# Patient Record
Sex: FEMALE | Race: WHITE
[De-identification: names, ages, dates, MRNs, and addresses within clinical notes are randomized per-mention and may not be internally consistent; named-entity substitution may affect disease eponyms.]

---

## 2022-07-14 PROBLEM — Z00.00 ENCOUNTER FOR PREVENTIVE HEALTH EXAMINATION: Status: ACTIVE | Noted: 2022-07-14

## 2022-07-15 ENCOUNTER — APPOINTMENT (OUTPATIENT)
Dept: RADIOLOGY | Facility: CLINIC | Age: 62
End: 2022-07-15

## 2022-07-15 PROCEDURE — 77080 DXA BONE DENSITY AXIAL: CPT

## 2022-07-31 ENCOUNTER — EMERGENCY (EMERGENCY)
Facility: HOSPITAL | Age: 62
LOS: 1 days | Discharge: HOME | End: 2022-07-31
Attending: EMERGENCY MEDICINE | Admitting: EMERGENCY MEDICINE
Payer: COMMERCIAL

## 2022-07-31 VITALS
SYSTOLIC BLOOD PRESSURE: 122 MMHG | OXYGEN SATURATION: 100 % | HEART RATE: 87 BPM | RESPIRATION RATE: 18 BRPM | TEMPERATURE: 98 F | DIASTOLIC BLOOD PRESSURE: 54 MMHG

## 2022-07-31 LAB
APTT BLD: 24.4 SEC — LOW (ref 27.5–35.5)
BLD GP AB SCN SERPL QL: NEGATIVE — SIGNIFICANT CHANGE UP
INR BLD: 0.98 — SIGNIFICANT CHANGE UP (ref 0.88–1.16)
LACTATE SERPL-SCNC: 2 MMOL/L — SIGNIFICANT CHANGE UP (ref 0.5–2)
LIDOCAIN IGE QN: 21 U/L — SIGNIFICANT CHANGE UP (ref 7–60)
PROTHROM AB SERPL-ACNC: 11.7 SEC — SIGNIFICANT CHANGE UP (ref 10.5–13.4)
RH IG SCN BLD-IMP: POSITIVE — SIGNIFICANT CHANGE UP

## 2022-07-31 PROCEDURE — 99285 EMERGENCY DEPT VISIT HI MDM: CPT

## 2022-07-31 RX ORDER — ONDANSETRON 8 MG/1
4 TABLET, FILM COATED ORAL ONCE
Refills: 0 | Status: COMPLETED | OUTPATIENT
Start: 2022-07-31 | End: 2022-07-31

## 2022-07-31 RX ORDER — DIATRIZOATE MEGLUMINE 180 MG/ML
30 INJECTION, SOLUTION INTRAVESICAL ONCE
Refills: 0 | Status: COMPLETED | OUTPATIENT
Start: 2022-07-31 | End: 2022-07-31

## 2022-07-31 RX ORDER — SODIUM CHLORIDE 9 MG/ML
1000 INJECTION INTRAMUSCULAR; INTRAVENOUS; SUBCUTANEOUS ONCE
Refills: 0 | Status: COMPLETED | OUTPATIENT
Start: 2022-07-31 | End: 2022-07-31

## 2022-07-31 RX ADMIN — DIATRIZOATE MEGLUMINE 30 MILLILITER(S): 180 INJECTION, SOLUTION INTRAVESICAL at 22:52

## 2022-07-31 RX ADMIN — SODIUM CHLORIDE 1000 MILLILITER(S): 9 INJECTION INTRAMUSCULAR; INTRAVENOUS; SUBCUTANEOUS at 22:52

## 2022-07-31 RX ADMIN — ONDANSETRON 4 MILLIGRAM(S): 8 TABLET, FILM COATED ORAL at 22:52

## 2022-07-31 NOTE — ED PROVIDER NOTE - NSFOLLOWUPINSTRUCTIONS_ED_ALL_ED_FT
You were seen in the Emergency Department today for nausea and vomiting.  Your work-up (labs, CT imaging) were reassuring to rule out an acute medical emergency.   Your results are on the pages to follow. We are unsure of exactly what is causing your symptoms but at this time, no other emergency testing is indicated in the ER today.    Drink plenty of fluids, get plenty of result.   Avoid spicy, greasy foods in coming days and try eating a bland diet.   Take over the counter medications (pepcid / maalox) as needed for stomach upset.    FOLLOW UP - You will require further evaluation after leaving the Emergency Department today. The remaining workup will be done as an outpatient given we don't have the capacity to perform every test in the ER. Follow up with your Primary Care Doctor within one week for further evaluation and management.    RETURN PRECAUTIONS - Return to this Emergency Department if you have any persistent, worsening, or new symptoms including severe abdominal pain, uncontrollable nausea/vomiting, severe diarrhea or inability to have a bowel movement, very dark/black stools or blood in your stool, burning with urination, high fever >102 degrees F, or any other serious concerns.

## 2022-07-31 NOTE — ED PROVIDER NOTE - NS ED ATTENDING STATEMENT MOD
This was a shared visit with the ARAMIS. I reviewed and verified the documentation and independently performed the documented:

## 2022-07-31 NOTE — ED PROVIDER NOTE - PATIENT PORTAL LINK FT
You can access the FollowMyHealth Patient Portal offered by [facility] by registering at the following website: [email or web address]. By joining [employer]’s FollowMyHealth portal, you will also be able to view your health information using other applications (apps) compatible with our system.

## 2022-07-31 NOTE — ED PROVIDER NOTE - CLINICAL SUMMARY MEDICAL DECISION MAKING FREE TEXT BOX
pt w history of hld, here w nausea & vomiting since 6pm. no abd pain. last BM this afternoon. no hx abd surgeries. sent to r/o SBO.   pt appears uncomfortable on arrival but vitals stable. abdominal exam benign.  low suspicion SBO given no abd pain and no surgical hx. possible gastritis, gastroenteritis  plan for labs, ua, ctap  iv hydration  antiemetics prn  serial abdominal exams.

## 2022-07-31 NOTE — ED ADULT NURSE NOTE - OBJECTIVE STATEMENT
Pt arrived to ED AAOX4, spont unlab breathing on RA, NAD. PT is c/o nausea and vomiting that began today. Pt called PMD and he advised pt to come to ED to rule out SBO. Pt has no hx of SBO, pt had small BM today. No diarrhea, no constipation, no blood in stool. Pt denies hematemesis and states vomit looks like food, and now is appearing yellow. Pt denies any changes with urination. Will monitor.

## 2022-07-31 NOTE — ED PROVIDER NOTE - PROGRESS NOTE DETAILS
labs unremarkable  ct without acute findings.   informed pt of results, pt and  reassured by unremarkable work-up. symptoms resolved. no n/v at this time. tolerating PO in ED. rpt abdominal exam benign. ok for dc and outpt follow up at this time.     All results reviewed with the patient verbally. Discharge plan and return precautions d/w pt who verbalized understanding and agrees with plan. All questions answered. Vitals WNL. Ready for d/c.

## 2022-07-31 NOTE — ED PROVIDER NOTE - ATTENDING APP SHARED VISIT CONTRIBUTION OF CARE
61 yr old female, history of HLD, presents to the Emergency Department with nausea and vomiting. Pt in usual state of health yesterday throughout the day. Around 6pm pt started w nausea and vomiting. Multiple episodes of non-bloody emesis since this time. Has primarily been food she ate. No abd pain. Was told by PMD to come to ED for r/o SBO. Last BM this afternoon. No diarrhea. No dark / bloody stools.   No episodes of similar in past. No hx abdominal surgeries. No hx SBO.   No fever, chest pain, SOB, urinary symptoms, leg swelling, headache, dizziness  vss  s1s2 lungs cta bl  abd soft nt nd +bs  IMP- N/V  zofran, IVF, CT a/p

## 2022-07-31 NOTE — ED PROVIDER NOTE - PHYSICAL EXAMINATION
Constitutional : Appears uncomfortable but nontoxic. awake, alert, oriented to person, place, time/situation.  Head : head normocephalic, atraumatic  EENMT : eyes clear bilaterally, PERRL, EOMI. airway patent. moist mucous membranes. neck supple.  Cardiac : Normal rate, regular rhythm. No murmur appreciated, no LE edema.  Resp : Breath sounds clear and equal bilaterally. Respirations even and unlabored.   Gastro : abdomen soft, nontender, nondistended. no rebound or guarding. no CVAT.  MSK :  range of motion is not limited, no muscle or joint tenderness  Vasc : Extremities warm and well perfused. 2+ radial and DP pulses. cap refill <2 seconds  Neuro : Alert and oriented, CNII-XII grossly intact, no focal deficits, no motor or sensory deficits.  Skin : Skin normal color for race, warm, dry and intact. No evidence of rash.  Psych : Alert and oriented to person, place, time/situation. normal mood and affect. no apparent risk to self or others.

## 2022-07-31 NOTE — ED ADULT TRIAGE NOTE - ARRIVAL INFO ADDITIONAL COMMENTS
pt sent in by dr loving to r/o obstruction after having N/V for several hours.   no hx of gi issues or obstruction.

## 2022-08-01 VITALS
SYSTOLIC BLOOD PRESSURE: 116 MMHG | DIASTOLIC BLOOD PRESSURE: 75 MMHG | OXYGEN SATURATION: 100 % | RESPIRATION RATE: 18 BRPM | HEART RATE: 74 BPM

## 2022-08-01 PROCEDURE — 86901 BLOOD TYPING SEROLOGIC RH(D): CPT

## 2022-08-01 PROCEDURE — 74177 CT ABD & PELVIS W/CONTRAST: CPT | Mod: 26,MA

## 2022-08-01 PROCEDURE — 99284 EMERGENCY DEPT VISIT MOD MDM: CPT | Mod: 25

## 2022-08-01 PROCEDURE — 36415 COLL VENOUS BLD VENIPUNCTURE: CPT

## 2022-08-01 PROCEDURE — 83690 ASSAY OF LIPASE: CPT

## 2022-08-01 PROCEDURE — 85730 THROMBOPLASTIN TIME PARTIAL: CPT

## 2022-08-01 PROCEDURE — 80053 COMPREHEN METABOLIC PANEL: CPT

## 2022-08-01 PROCEDURE — 86850 RBC ANTIBODY SCREEN: CPT

## 2022-08-01 PROCEDURE — 85025 COMPLETE CBC W/AUTO DIFF WBC: CPT

## 2022-08-01 PROCEDURE — 87635 SARS-COV-2 COVID-19 AMP PRB: CPT

## 2022-08-01 PROCEDURE — 85610 PROTHROMBIN TIME: CPT

## 2022-08-01 PROCEDURE — 83605 ASSAY OF LACTIC ACID: CPT

## 2022-08-01 PROCEDURE — 96374 THER/PROPH/DIAG INJ IV PUSH: CPT

## 2022-08-01 PROCEDURE — 74177 CT ABD & PELVIS W/CONTRAST: CPT | Mod: MA

## 2022-08-01 PROCEDURE — 86900 BLOOD TYPING SEROLOGIC ABO: CPT

## 2022-08-02 DIAGNOSIS — R11.2 NAUSEA WITH VOMITING, UNSPECIFIED: ICD-10-CM

## 2022-08-02 DIAGNOSIS — Z20.822 CONTACT WITH AND (SUSPECTED) EXPOSURE TO COVID-19: ICD-10-CM

## 2022-08-02 DIAGNOSIS — E78.5 HYPERLIPIDEMIA, UNSPECIFIED: ICD-10-CM

## 2022-10-19 ENCOUNTER — APPOINTMENT (OUTPATIENT)
Dept: RADIOLOGY | Facility: CLINIC | Age: 62
End: 2022-10-19

## 2024-02-28 ENCOUNTER — APPOINTMENT (OUTPATIENT)
Dept: RADIOLOGY | Facility: CLINIC | Age: 64
End: 2024-02-28
Payer: COMMERCIAL

## 2024-02-28 PROCEDURE — 71046 X-RAY EXAM CHEST 2 VIEWS: CPT
